# Patient Record
Sex: MALE | Race: ASIAN | URBAN - METROPOLITAN AREA
[De-identification: names, ages, dates, MRNs, and addresses within clinical notes are randomized per-mention and may not be internally consistent; named-entity substitution may affect disease eponyms.]

---

## 2018-04-26 ENCOUNTER — TELEPHONE (OUTPATIENT)
Dept: FAMILY MEDICINE CLINIC | Age: 53
End: 2018-04-26

## 2018-05-14 ENCOUNTER — OFFICE VISIT (OUTPATIENT)
Dept: FAMILY MEDICINE CLINIC | Age: 53
End: 2018-05-14

## 2018-05-14 VITALS
BODY MASS INDEX: 25.54 KG/M2 | DIASTOLIC BLOOD PRESSURE: 72 MMHG | OXYGEN SATURATION: 98 % | WEIGHT: 178.4 LBS | SYSTOLIC BLOOD PRESSURE: 110 MMHG | HEART RATE: 97 BPM | HEIGHT: 70 IN | RESPIRATION RATE: 12 BRPM

## 2018-05-14 DIAGNOSIS — E78.2 MIXED HYPERLIPIDEMIA: ICD-10-CM

## 2018-05-14 DIAGNOSIS — Z00.00 WELL ADULT EXAM: Primary | ICD-10-CM

## 2018-05-14 PROCEDURE — 99396 PREV VISIT EST AGE 40-64: CPT | Performed by: FAMILY MEDICINE

## 2018-05-14 RX ORDER — SCOLOPAMINE TRANSDERMAL SYSTEM 1 MG/1
1 PATCH, EXTENDED RELEASE TRANSDERMAL
Qty: 4 PATCH | Refills: 0 | Status: SHIPPED | OUTPATIENT
Start: 2018-05-14 | End: 2019-05-14

## 2018-05-14 ASSESSMENT — PATIENT HEALTH QUESTIONNAIRE - PHQ9
SUM OF ALL RESPONSES TO PHQ QUESTIONS 1-9: 0
2. FEELING DOWN, DEPRESSED OR HOPELESS: 0
SUM OF ALL RESPONSES TO PHQ9 QUESTIONS 1 & 2: 0
1. LITTLE INTEREST OR PLEASURE IN DOING THINGS: 0

## 2018-05-15 ASSESSMENT — ENCOUNTER SYMPTOMS
GASTROINTESTINAL NEGATIVE: 1
SHORTNESS OF BREATH: 0
COUGH: 0

## 2018-05-16 DIAGNOSIS — Z00.00 WELL ADULT EXAM: ICD-10-CM

## 2018-05-16 LAB
A/G RATIO: 1.8 (ref 1.1–2.2)
ALBUMIN SERPL-MCNC: 4.4 G/DL (ref 3.4–5)
ALP BLD-CCNC: 66 U/L (ref 40–129)
ALT SERPL-CCNC: 43 U/L (ref 10–40)
ANION GAP SERPL CALCULATED.3IONS-SCNC: 14 MMOL/L (ref 3–16)
AST SERPL-CCNC: 35 U/L (ref 15–37)
BILIRUB SERPL-MCNC: 0.6 MG/DL (ref 0–1)
BUN BLDV-MCNC: 16 MG/DL (ref 7–20)
CALCIUM SERPL-MCNC: 9 MG/DL (ref 8.3–10.6)
CHLORIDE BLD-SCNC: 99 MMOL/L (ref 99–110)
CHOLESTEROL, TOTAL: 215 MG/DL (ref 0–199)
CO2: 27 MMOL/L (ref 21–32)
CREAT SERPL-MCNC: 0.8 MG/DL (ref 0.9–1.3)
GFR AFRICAN AMERICAN: >60
GFR NON-AFRICAN AMERICAN: >60
GLOBULIN: 2.5 G/DL
GLUCOSE BLD-MCNC: 90 MG/DL (ref 70–99)
HDLC SERPL-MCNC: 45 MG/DL (ref 40–60)
LDL CHOLESTEROL CALCULATED: 143 MG/DL
POTASSIUM SERPL-SCNC: 4.4 MMOL/L (ref 3.5–5.1)
SODIUM BLD-SCNC: 140 MMOL/L (ref 136–145)
TOTAL PROTEIN: 6.9 G/DL (ref 6.4–8.2)
TRIGL SERPL-MCNC: 136 MG/DL (ref 0–150)
VLDLC SERPL CALC-MCNC: 27 MG/DL

## 2018-05-17 LAB
HIV AG/AB: NORMAL
HIV ANTIGEN: NORMAL
HIV-1 ANTIBODY: NORMAL
HIV-2 AB: NORMAL

## 2019-08-16 ENCOUNTER — OFFICE VISIT (OUTPATIENT)
Dept: FAMILY MEDICINE CLINIC | Age: 54
End: 2019-08-16
Payer: COMMERCIAL

## 2019-08-16 VITALS
BODY MASS INDEX: 23.68 KG/M2 | SYSTOLIC BLOOD PRESSURE: 122 MMHG | HEART RATE: 50 BPM | DIASTOLIC BLOOD PRESSURE: 72 MMHG | HEIGHT: 70 IN | WEIGHT: 165.4 LBS | RESPIRATION RATE: 12 BRPM | OXYGEN SATURATION: 98 %

## 2019-08-16 DIAGNOSIS — R05.9 COUGH: ICD-10-CM

## 2019-08-16 DIAGNOSIS — Z00.00 WELL ADULT EXAM: Primary | ICD-10-CM

## 2019-08-16 PROCEDURE — 99396 PREV VISIT EST AGE 40-64: CPT | Performed by: FAMILY MEDICINE

## 2019-08-16 ASSESSMENT — PATIENT HEALTH QUESTIONNAIRE - PHQ9
SUM OF ALL RESPONSES TO PHQ QUESTIONS 1-9: 0
2. FEELING DOWN, DEPRESSED OR HOPELESS: 0
SUM OF ALL RESPONSES TO PHQ9 QUESTIONS 1 & 2: 0
1. LITTLE INTEREST OR PLEASURE IN DOING THINGS: 0
SUM OF ALL RESPONSES TO PHQ QUESTIONS 1-9: 0

## 2019-08-16 ASSESSMENT — ENCOUNTER SYMPTOMS: RESPIRATORY NEGATIVE: 1

## 2019-08-16 NOTE — PROGRESS NOTES
normal mood and affect. His behavior is normal. Judgment and thought content normal.       Assessment:       Diagnosis Orders   1. Well adult exam  Lipid Panel    Comprehensive Metabolic Panel    VITAMIN B12 & FOLATE    Vitamin D 25 Hydroxy   2. Cough  XR CHEST STANDARD (2 VW)          Plan:      Courtney was seen today for annual exam.    Diagnoses and all orders for this visit:    Well adult exam  -     Lipid Panel; Future  -     Comprehensive Metabolic Panel; Future  -     VITAMIN B12 & FOLATE; Future  -     Vitamin D 25 Hydroxy; Future  Reviewed diet and exercise. Fasting bloodwork ordered. Vegetarian. b12 otc recommended  Cough  -     XR CHEST STANDARD (2 VW);  Future  chest X-ray per pt preference           Amy Diaz MD

## 2019-08-19 DIAGNOSIS — Z00.00 WELL ADULT EXAM: ICD-10-CM

## 2019-08-19 LAB
A/G RATIO: 2.2 (ref 1.1–2.2)
ALBUMIN SERPL-MCNC: 4.8 G/DL (ref 3.4–5)
ALP BLD-CCNC: 71 U/L (ref 40–129)
ALT SERPL-CCNC: 19 U/L (ref 10–40)
ANION GAP SERPL CALCULATED.3IONS-SCNC: 14 MMOL/L (ref 3–16)
AST SERPL-CCNC: 27 U/L (ref 15–37)
BILIRUB SERPL-MCNC: 0.6 MG/DL (ref 0–1)
BUN BLDV-MCNC: 17 MG/DL (ref 7–20)
CALCIUM SERPL-MCNC: 9.5 MG/DL (ref 8.3–10.6)
CHLORIDE BLD-SCNC: 102 MMOL/L (ref 99–110)
CHOLESTEROL, TOTAL: 199 MG/DL (ref 0–199)
CO2: 25 MMOL/L (ref 21–32)
CREAT SERPL-MCNC: 0.8 MG/DL (ref 0.9–1.3)
FOLATE: 16.63 NG/ML (ref 4.78–24.2)
GFR AFRICAN AMERICAN: >60
GFR NON-AFRICAN AMERICAN: >60
GLOBULIN: 2.2 G/DL
GLUCOSE BLD-MCNC: 80 MG/DL (ref 70–99)
HDLC SERPL-MCNC: 53 MG/DL (ref 40–60)
LDL CHOLESTEROL CALCULATED: 126 MG/DL
POTASSIUM SERPL-SCNC: 4.1 MMOL/L (ref 3.5–5.1)
SODIUM BLD-SCNC: 141 MMOL/L (ref 136–145)
TOTAL PROTEIN: 7 G/DL (ref 6.4–8.2)
TRIGL SERPL-MCNC: 100 MG/DL (ref 0–150)
VITAMIN B-12: 153 PG/ML (ref 211–911)
VITAMIN D 25-HYDROXY: 12.3 NG/ML
VLDLC SERPL CALC-MCNC: 20 MG/DL

## 2020-04-23 ENCOUNTER — NURSE TRIAGE (OUTPATIENT)
Dept: OTHER | Facility: CLINIC | Age: 55
End: 2020-04-23

## 2020-04-23 NOTE — TELEPHONE ENCOUNTER
Reason for Disposition   Pain in the big toe joint    Answer Assessment - Initial Assessment Questions  1. ONSET: \"When did the pain start? \"       A month and a half ago  2. LOCATION: \"Where is the pain located? \"   (e.g., around nail, entire toe, at foot joint)       Left big toe and toe next to it  3. PAIN: \"How bad is the pain? \"    (Scale 1-10; or mild, moderate, severe)    -  MILD (1-3): doesn't interfere with normal activities     -  MODERATE (4-7): interferes with normal activities (e.g., work or school) or awakens from sleep, limping     -  SEVERE (8-10): excruciating pain, unable to do any normal activities, unable to walk     3-4  4. APPEARANCE: \"What does the toe look like? \" (e.g., redness, swelling, bruising, pallor)     States the skin is hard and pale   5. CAUSE: \"What do you think is causing the toe pain? \"     Feels like there is no circulation   6. OTHER SYMPTOMS: \"Do you have any other symptoms? \" (e.g., leg pain, rash, fever, numbness)     no  7. PREGNANCY: \"Is there any chance you are pregnant? \" \"When was your last menstrual period? \"      n/a    Protocols used: TOE PAIN-ADULT-[place]    Received call from hotline. Recommended PCP or virtual visit within 3 days per protocol. Call transferred to [facility] for scheduling. Patient having no complaints of CP or SOB. Informed when to call nurse back.

## 2020-04-24 ENCOUNTER — VIRTUAL VISIT (OUTPATIENT)
Dept: FAMILY MEDICINE CLINIC | Age: 55
End: 2020-04-24
Payer: COMMERCIAL

## 2020-04-24 PROCEDURE — 3017F COLORECTAL CA SCREEN DOC REV: CPT | Performed by: FAMILY MEDICINE

## 2020-04-24 PROCEDURE — 1036F TOBACCO NON-USER: CPT | Performed by: FAMILY MEDICINE

## 2020-04-24 PROCEDURE — 99212 OFFICE O/P EST SF 10 MIN: CPT | Performed by: FAMILY MEDICINE

## 2020-04-24 PROCEDURE — G8420 CALC BMI NORM PARAMETERS: HCPCS | Performed by: FAMILY MEDICINE

## 2020-04-24 PROCEDURE — G8427 DOCREV CUR MEDS BY ELIG CLIN: HCPCS | Performed by: FAMILY MEDICINE

## 2020-04-24 ASSESSMENT — PATIENT HEALTH QUESTIONNAIRE - PHQ9
2. FEELING DOWN, DEPRESSED OR HOPELESS: 0
1. LITTLE INTEREST OR PLEASURE IN DOING THINGS: 0
SUM OF ALL RESPONSES TO PHQ QUESTIONS 1-9: 0
SUM OF ALL RESPONSES TO PHQ9 QUESTIONS 1 & 2: 0
SUM OF ALL RESPONSES TO PHQ QUESTIONS 1-9: 0

## 2020-04-24 NOTE — PROGRESS NOTES
2020    TELEHEALTH EVALUATION -- Audio/Visual (During SHFTK-85 public health emergency)    HPI:    Markus Birmingham (:  1965) has requested an audio/video evaluation for the following concern(s):    Chief Complaint   Patient presents with    Toe Pain     Patient complains of right big toe pain (discolored) that is also in other toes as well for the past 6 weeks. Denies injury. Right big toe pain   Discoloration of 1st 2 toes  Hurts at the tips of 1st 3 toes. Feet don't feel cold. Some relief with soaks. No injury. Started 1 1/2 months ago. Symptoms not worse with exertion, only to palpation    Review of Systems  No shortness of breath or exercise intolerance  Prior to Visit Medications    Not on File       Social History     Tobacco Use    Smoking status: Former Smoker     Packs/day: 0.25     Years: 10.00     Pack years: 2.50    Smokeless tobacco: Never Used   Substance Use Topics    Alcohol use: Yes     Comment: Occ    Drug use: No        Allergies   Allergen Reactions    Seasonal        PHYSICAL EXAMINATION:  [ INSTRUCTIONS:  \"[x]\" Indicates a positive item  \"[]\" Indicates a negative item  -- DELETE ALL ITEMS NOT EXAMINED]  Vital Signs: (As obtained by patient/caregiver or practitioner observation)    Blood pressure-  Heart rate-    Respiratory rate-    Temperature-  Pulse oximetry-     Constitutional: [] Appears well-developed and well-nourished [] No apparent distress      [] Abnormal-   Mental status  [] Alert and awake  [] Oriented to person/place/time []Able to follow commands      Eyes:  EOM    []  Normal  [] Abnormal-  Sclera  []  Normal  [] Abnormal -         Discharge []  None visible  [] Abnormal -    HENT:   [] Normocephalic, atraumatic.   [] Abnormal   [] Mouth/Throat: Mucous membranes are moist.     External Ears [] Normal  [] Abnormal-     Neck: [] No visualized mass     Pulmonary/Chest: [] Respiratory effort normal.  [] No visualized signs of difficulty breathing or

## 2021-03-18 ENCOUNTER — OFFICE VISIT (OUTPATIENT)
Dept: FAMILY MEDICINE CLINIC | Age: 56
End: 2021-03-18
Payer: COMMERCIAL

## 2021-03-18 VITALS
HEIGHT: 70 IN | TEMPERATURE: 97.4 F | WEIGHT: 174.4 LBS | HEART RATE: 49 BPM | SYSTOLIC BLOOD PRESSURE: 122 MMHG | OXYGEN SATURATION: 99 % | BODY MASS INDEX: 24.97 KG/M2 | DIASTOLIC BLOOD PRESSURE: 72 MMHG

## 2021-03-18 DIAGNOSIS — I73.00 RAYNAUD'S DISEASE WITHOUT GANGRENE: Primary | ICD-10-CM

## 2021-03-18 DIAGNOSIS — I73.00 RAYNAUD'S DISEASE WITHOUT GANGRENE: ICD-10-CM

## 2021-03-18 LAB
A/G RATIO: 1.6 (ref 1.1–2.2)
ALBUMIN SERPL-MCNC: 4.1 G/DL (ref 3.4–5)
ALP BLD-CCNC: 73 U/L (ref 40–129)
ALT SERPL-CCNC: 16 U/L (ref 10–40)
ANION GAP SERPL CALCULATED.3IONS-SCNC: 9 MMOL/L (ref 3–16)
ANTI-NUCLEAR ANTIBODY (ANA): NEGATIVE
AST SERPL-CCNC: 21 U/L (ref 15–37)
BASOPHILS ABSOLUTE: 0 K/UL (ref 0–0.2)
BASOPHILS RELATIVE PERCENT: 0.6 %
BILIRUB SERPL-MCNC: 0.5 MG/DL (ref 0–1)
BUN BLDV-MCNC: 17 MG/DL (ref 7–20)
C-REACTIVE PROTEIN: <3 MG/L (ref 0–5.1)
CALCIUM SERPL-MCNC: 9.1 MG/DL (ref 8.3–10.6)
CHLORIDE BLD-SCNC: 106 MMOL/L (ref 99–110)
CO2: 26 MMOL/L (ref 21–32)
CREAT SERPL-MCNC: 0.8 MG/DL (ref 0.9–1.3)
CYCLIC CITRULLINATED PEPTIDE ANTIBODY IGG: 0.5 U/ML (ref 0–2.9)
EOSINOPHILS ABSOLUTE: 0.2 K/UL (ref 0–0.6)
EOSINOPHILS RELATIVE PERCENT: 5 %
GFR AFRICAN AMERICAN: >60
GFR NON-AFRICAN AMERICAN: >60
GLOBULIN: 2.5 G/DL
GLUCOSE BLD-MCNC: 90 MG/DL (ref 70–99)
HCT VFR BLD CALC: 38.5 % (ref 40.5–52.5)
HEMOGLOBIN: 13.1 G/DL (ref 13.5–17.5)
LYMPHOCYTES ABSOLUTE: 1.2 K/UL (ref 1–5.1)
LYMPHOCYTES RELATIVE PERCENT: 26.6 %
MCH RBC QN AUTO: 30.3 PG (ref 26–34)
MCHC RBC AUTO-ENTMCNC: 34.1 G/DL (ref 31–36)
MCV RBC AUTO: 88.8 FL (ref 80–100)
MONOCYTES ABSOLUTE: 0.6 K/UL (ref 0–1.3)
MONOCYTES RELATIVE PERCENT: 13.4 %
NEUTROPHILS ABSOLUTE: 2.4 K/UL (ref 1.7–7.7)
NEUTROPHILS RELATIVE PERCENT: 54.4 %
PDW BLD-RTO: 13.1 % (ref 12.4–15.4)
PLATELET # BLD: 140 K/UL (ref 135–450)
PMV BLD AUTO: 8.9 FL (ref 5–10.5)
POTASSIUM SERPL-SCNC: 4.3 MMOL/L (ref 3.5–5.1)
RBC # BLD: 4.34 M/UL (ref 4.2–5.9)
RHEUMATOID FACTOR: <10 IU/ML
SEDIMENTATION RATE, ERYTHROCYTE: 7 MM/HR (ref 0–20)
SODIUM BLD-SCNC: 141 MMOL/L (ref 136–145)
TOTAL PROTEIN: 6.6 G/DL (ref 6.4–8.2)
TSH REFLEX: 1.74 UIU/ML (ref 0.27–4.2)
URIC ACID, SERUM: 4.9 MG/DL (ref 3.5–7.2)
WBC # BLD: 4.4 K/UL (ref 4–11)

## 2021-03-18 PROCEDURE — 99213 OFFICE O/P EST LOW 20 MIN: CPT | Performed by: FAMILY MEDICINE

## 2021-03-18 PROCEDURE — G8427 DOCREV CUR MEDS BY ELIG CLIN: HCPCS | Performed by: FAMILY MEDICINE

## 2021-03-18 PROCEDURE — 1036F TOBACCO NON-USER: CPT | Performed by: FAMILY MEDICINE

## 2021-03-18 PROCEDURE — 3017F COLORECTAL CA SCREEN DOC REV: CPT | Performed by: FAMILY MEDICINE

## 2021-03-18 PROCEDURE — G8419 CALC BMI OUT NRM PARAM NOF/U: HCPCS | Performed by: FAMILY MEDICINE

## 2021-03-18 PROCEDURE — G8484 FLU IMMUNIZE NO ADMIN: HCPCS | Performed by: FAMILY MEDICINE

## 2021-03-18 RX ORDER — AMLODIPINE BESYLATE 2.5 MG/1
2.5 TABLET ORAL DAILY
Qty: 30 TABLET | Refills: 5 | Status: SHIPPED | OUTPATIENT
Start: 2021-03-18 | End: 2022-03-31

## 2021-03-18 SDOH — ECONOMIC STABILITY: FOOD INSECURITY: WITHIN THE PAST 12 MONTHS, THE FOOD YOU BOUGHT JUST DIDN'T LAST AND YOU DIDN'T HAVE MONEY TO GET MORE.: NEVER TRUE

## 2021-03-18 SDOH — ECONOMIC STABILITY: TRANSPORTATION INSECURITY
IN THE PAST 12 MONTHS, HAS LACK OF TRANSPORTATION KEPT YOU FROM MEETINGS, WORK, OR FROM GETTING THINGS NEEDED FOR DAILY LIVING?: NO

## 2021-03-18 ASSESSMENT — PATIENT HEALTH QUESTIONNAIRE - PHQ9
1. LITTLE INTEREST OR PLEASURE IN DOING THINGS: 0
SUM OF ALL RESPONSES TO PHQ QUESTIONS 1-9: 0

## 2021-03-18 NOTE — PROGRESS NOTES
Alberto Schmitz (:  1965) is a 50 y.o. male,Established patient, here for evaluation of the following chief complaint(s): Foot Pain (Patient complains of bilateral foot pain x2 weeks. )      ASSESSMENT/PLAN:   Diagnosis Orders   1. Raynaud's disease without gangrene  Sedimentation Rate    C-Reactive Protein    Cyclic Citrul Peptide Antibody, IgG    Uric Acid    Rheumatoid Factor    TSH with Reflex    CBC Auto Differential    Comprehensive Metabolic Panel    amLODIPine (NORVASC) 2.5 MG tablet    ELBA profile    Cold agglutinin screen     Trial of med since so symptomatic. Evaluation since onset abrupt. Consider referral to rheumatology  No follow-ups on file. SUBJECTIVE/OBJECTIVE:  HPI   Pt is a of 50 y.o. male comes in today with   Chief Complaint   Patient presents with    Foot Pain     Patient complains of bilateral foot pain x2 weeks. Bilateral toe pain for 2 weeks. Has kept him from running. Hurts to touch. Red, swollen. advil has taken the edge off. Soaks help temporarily. Episode 2-3 months ago but wasn't severe and didn't last as long    Allergies   Allergen Reactions    Seasonal      Vitals:    21 0853   BP: 122/72   Site: Left Upper Arm   Position: Sitting   Cuff Size: Large Adult   Pulse: (!) 49   Temp: 97.4 °F (36.3 °C)   TempSrc: Infrared   SpO2: 99%   Weight: 174 lb 6.4 oz (79.1 kg)   Height: 5' 10\" (1.778 m)      Review of Systems    Physical Exam  Normal dp and pt pulses  Toes cold, white            An electronic signature was used to authenticate this note.     --Cade Busch MD

## 2021-03-21 LAB — COLD AGGLUTININ TITER: NORMAL

## 2022-03-31 ENCOUNTER — OFFICE VISIT (OUTPATIENT)
Dept: FAMILY MEDICINE CLINIC | Age: 57
End: 2022-03-31
Payer: COMMERCIAL

## 2022-03-31 VITALS
DIASTOLIC BLOOD PRESSURE: 68 MMHG | HEIGHT: 70 IN | HEART RATE: 50 BPM | OXYGEN SATURATION: 98 % | BODY MASS INDEX: 25.48 KG/M2 | SYSTOLIC BLOOD PRESSURE: 110 MMHG | WEIGHT: 178 LBS

## 2022-03-31 DIAGNOSIS — M65.4 RADIAL STYLOID TENOSYNOVITIS OF LEFT HAND: Primary | ICD-10-CM

## 2022-03-31 PROCEDURE — G8484 FLU IMMUNIZE NO ADMIN: HCPCS | Performed by: FAMILY MEDICINE

## 2022-03-31 PROCEDURE — 1036F TOBACCO NON-USER: CPT | Performed by: FAMILY MEDICINE

## 2022-03-31 PROCEDURE — G8427 DOCREV CUR MEDS BY ELIG CLIN: HCPCS | Performed by: FAMILY MEDICINE

## 2022-03-31 PROCEDURE — 3017F COLORECTAL CA SCREEN DOC REV: CPT | Performed by: FAMILY MEDICINE

## 2022-03-31 PROCEDURE — 99213 OFFICE O/P EST LOW 20 MIN: CPT | Performed by: FAMILY MEDICINE

## 2022-03-31 PROCEDURE — G8419 CALC BMI OUT NRM PARAM NOF/U: HCPCS | Performed by: FAMILY MEDICINE

## 2022-03-31 RX ORDER — DICLOFENAC SODIUM 75 MG/1
75 TABLET, DELAYED RELEASE ORAL 2 TIMES DAILY
Qty: 60 TABLET | Refills: 3 | Status: SHIPPED | OUTPATIENT
Start: 2022-03-31

## 2022-03-31 SDOH — ECONOMIC STABILITY: FOOD INSECURITY: WITHIN THE PAST 12 MONTHS, YOU WORRIED THAT YOUR FOOD WOULD RUN OUT BEFORE YOU GOT MONEY TO BUY MORE.: NEVER TRUE

## 2022-03-31 SDOH — ECONOMIC STABILITY: FOOD INSECURITY: WITHIN THE PAST 12 MONTHS, THE FOOD YOU BOUGHT JUST DIDN'T LAST AND YOU DIDN'T HAVE MONEY TO GET MORE.: NEVER TRUE

## 2022-03-31 ASSESSMENT — PATIENT HEALTH QUESTIONNAIRE - PHQ9
SUM OF ALL RESPONSES TO PHQ QUESTIONS 1-9: 0
1. LITTLE INTEREST OR PLEASURE IN DOING THINGS: 0
SUM OF ALL RESPONSES TO PHQ9 QUESTIONS 1 & 2: 0
SUM OF ALL RESPONSES TO PHQ QUESTIONS 1-9: 0
SUM OF ALL RESPONSES TO PHQ QUESTIONS 1-9: 0
2. FEELING DOWN, DEPRESSED OR HOPELESS: 0
SUM OF ALL RESPONSES TO PHQ QUESTIONS 1-9: 0

## 2022-03-31 ASSESSMENT — SOCIAL DETERMINANTS OF HEALTH (SDOH): HOW HARD IS IT FOR YOU TO PAY FOR THE VERY BASICS LIKE FOOD, HOUSING, MEDICAL CARE, AND HEATING?: NOT HARD AT ALL

## 2022-03-31 NOTE — PATIENT INSTRUCTIONS
Patient Education        Selina Dodge Tenosynovitis: Care Instructions  Your Care Instructions  Selina Dodge (say \"Janelle\") tenosynovitis is a problem that makes the bottom of your thumb and the side of your wrist hurt. When you have de Quervain's, the ropey fiber (tendon) that helps move your thumb away from yourfingers becomes swollen. You may have pain when you move your wrist or pick things up. You may hear acreaking sound when you move your wrist or thumb. Symptoms often get better in a few weeks with home care. Your doctor may want you to start some gentle stretching exercises once your symptoms are gone. Sometimes treatment with an injection or surgery is needed. Follow-up care is a key part of your treatment and safety. Be sure to make and go to all appointments, and call your doctor if you are having problems. It's also a good idea to know your test results and keep alist of the medicines you take. How can you care for yourself at home?  Until your symptoms are better, stop the activities that caused the pain.  Avoid moving the hand and wrist that hurt.  Follow your doctor's directions for wearing a splint to keep your thumb and wrist from moving.  Try ice or heat. ? Put ice or a cold pack on your thumb and wrist for 10 to 20 minutes at a time. Put a thin cloth between the ice and your skin. ? You can use heat for 20 to 30 minutes, 2 or 3 times a day. Try using a heating pad, hot shower, or hot pack.  Ask your doctor if you can take an over-the-counter pain medicine, such as acetaminophen (Tylenol), ibuprofen (Advil, Motrin), or naproxen (Aleve). Be safe with medicines. Read and follow all instructions on the label. When should you call for help?   Watch closely for changes in your health, and be sure to contact your doctor if:     You have new or worse pain.      You have new or worse numbness or tingling in your hand or fingers.      Your hand feels weaker.      You do not get better as expected. Where can you learn more? Go to https://chpepiceweb.[email or web address] org and sign in to your [employer] account. Enter G107 in the [facility] box to learn more about \"De Quervain's Tenosynovitis: Care Instructions. \"     If you do not have an account, please click on the \"Sign Up Now\" link. Current as of: July 1, 2021               Content Version: 13.2  © 2006-2022 Healthwise, [employer]. Care instructions adapted under license by [facility] ([facility]). If you have questions about a medical condition or this instruction, always ask your healthcare professional. [address] any warranty or liability for your use of this information. Patient Education        Graeme Gabirais Disease: Exercises  Introduction  Here are some examples of exercises for you to try. The exercises may be suggested for a condition or for rehabilitation. Start each exercise slowly. Ease off the exercises if you start to have pain. You will be told when to start these exercises and which ones will work bestfor you. How to do the exercises  Thumb lifts    1. Place your hand on a flat surface, with your palm up. 2. Lift your thumb away from your palm to make a \"C\" shape. 3. Hold for about 6 seconds. 4. Repeat 8 to 12 times. Passive thumb MP flexion    1. Hold your hand in front of you, and turn your hand so your little finger faces down and your thumb faces up. (Your hand should be in the position used for shaking someone's hand.) You may also rest your hand on a flat surface. 2. Use the fingers on your other hand to bend your thumb down at the point where your thumb connects to your palm. 3. Hold for at least 15 to 30 seconds. 4. Repeat 2 to 4 times. Finkelstein stretch    1. Hold your arms out in front of you. (Your hand should be in the position used for shaking someone's hand.)  2. Bend your thumb toward your palm.   3. Use your other hand to gently stretch your thumb and wrist downward until you feel the stretch on the thumb side of your wrist.  4. Hold for at least 15 to 30 seconds. 5. Repeat 2 to 4 times. Resisted ulnar deviation    For this exercise, you will need elastic exercise material, such as surgicaltubing or Thera-Band. 1. Sit leaning forward with your legs slightly spread and your elbow on your thigh. 2. Grasp one end of the band with your palm down, and step on the other end with the foot opposite the hand holding the band. 3. Slowly bend your wrist sideways and away from your knee. 4. Repeat 8 to 12 times. Follow-up care is a key part of your treatment and safety. Be sure to make and go to all appointments, and call your doctor if you are having problems. It's also a good idea to know your test results and keep alist of the medicines you take. Where can you learn more? Go to https://Flutura SolutionspelilyVolexeb.[employer] org and sign in to your [employer] account. Enter G164 in the [employer] box to learn more about \"De Quervain's Disease: Exercises. \"     If you do not have an account, please click on the \"Sign Up Now\" link. Current as of: July 1, 2021               Content Version: 13.2  © 9673-1222 Healthwise, Incorporated. Care instructions adapted under license by [facility] ([facility]). If you have questions about a medical condition or this instruction, always ask your healthcare professional. [place] any warranty or liability for your use of this information.

## 2022-03-31 NOTE — PROGRESS NOTES
Deonna Fraire (:  1965) is a 49 y.o. male,Established patient, here for evaluation of the following chief complaint(s):  Pain (L thumb pain when extending.)         ASSESSMENT/PLAN:  Courtney was seen today for pain. Diagnoses and all orders for this visit:    Radial styloid tenosynovitis of left hand    Other orders  -     diclofenac (VOLTAREN) 75 MG EC tablet; Take 1 tablet by mouth 2 times daily    reviewed bracing  nsaid  Hep  Advised to call back directly if there are further questions, or if these symptoms fail to improve as anticipated or worsen. No follow-ups on file. Subjective   SUBJECTIVE/OBJECTIVE:  HPI   Pt is a of 49 y.o. male comes in today with   Chief Complaint   Patient presents with    Pain     L thumb pain when extending. top of left thumb started to hurt a few months ago. No injury  Vitals:    22 0913   BP: 110/68   Site: Left Upper Arm   Position: Sitting   Cuff Size: Small Adult   Pulse: 50   SpO2: 98%   Weight: 178 lb (80.7 kg)   Height: 5' 10\" (1.778 m)       Past Medical History:Reviewed  Medications:Reviewed. Allergies   Allergen Reactions    Seasonal       Social hx:Reviewed. Social History     Tobacco Use   Smoking Status Former Smoker    Packs/day: 0.25    Years: 10.00    Pack years: 2.50   Smokeless Tobacco Never Used       Review of Systems       Objective   Physical Exam         An electronic signature was used to authenticate this note.     --Tracey Stephens MD

## 2022-04-01 ENCOUNTER — TELEPHONE (OUTPATIENT)
Dept: FAMILY MEDICINE CLINIC | Age: 57
End: 2022-04-01

## 2022-04-01 DIAGNOSIS — Z00.00 WELL ADULT EXAM: ICD-10-CM

## 2022-04-01 DIAGNOSIS — E78.2 MIXED HYPERLIPIDEMIA: ICD-10-CM

## 2022-04-01 DIAGNOSIS — Z00.00 WELL ADULT EXAM: Primary | ICD-10-CM

## 2022-04-01 LAB
A/G RATIO: 1.8 (ref 1.1–2.2)
ALBUMIN SERPL-MCNC: 4.4 G/DL (ref 3.4–5)
ALP BLD-CCNC: 62 U/L (ref 40–129)
ALT SERPL-CCNC: 29 U/L (ref 10–40)
ANION GAP SERPL CALCULATED.3IONS-SCNC: 11 MMOL/L (ref 3–16)
AST SERPL-CCNC: 30 U/L (ref 15–37)
BILIRUB SERPL-MCNC: 0.5 MG/DL (ref 0–1)
BUN BLDV-MCNC: 15 MG/DL (ref 7–20)
CALCIUM SERPL-MCNC: 9.1 MG/DL (ref 8.3–10.6)
CHLORIDE BLD-SCNC: 103 MMOL/L (ref 99–110)
CHOLESTEROL, TOTAL: 233 MG/DL (ref 0–199)
CO2: 25 MMOL/L (ref 21–32)
CREAT SERPL-MCNC: 0.9 MG/DL (ref 0.9–1.3)
GFR AFRICAN AMERICAN: >60
GFR NON-AFRICAN AMERICAN: >60
GLUCOSE BLD-MCNC: 88 MG/DL (ref 70–99)
HDLC SERPL-MCNC: 50 MG/DL (ref 40–60)
LDL CHOLESTEROL CALCULATED: 158 MG/DL
POTASSIUM SERPL-SCNC: 4.5 MMOL/L (ref 3.5–5.1)
SODIUM BLD-SCNC: 139 MMOL/L (ref 136–145)
TOTAL PROTEIN: 6.8 G/DL (ref 6.4–8.2)
TRIGL SERPL-MCNC: 126 MG/DL (ref 0–150)
VLDLC SERPL CALC-MCNC: 25 MG/DL

## 2022-04-01 NOTE — TELEPHONE ENCOUNTER
Pt called and is currently at the lab. Pt was under the impression that his labs were already in his chart but they are not. Pt upset stating that he left his job to get his work and would like them called in. Tried to contact MA's but none available. Please put in orders ASAP and call pt.

## 2022-04-07 ENCOUNTER — OFFICE VISIT (OUTPATIENT)
Dept: FAMILY MEDICINE CLINIC | Age: 57
End: 2022-04-07
Payer: COMMERCIAL

## 2022-04-07 VITALS
DIASTOLIC BLOOD PRESSURE: 74 MMHG | WEIGHT: 176 LBS | HEART RATE: 90 BPM | OXYGEN SATURATION: 98 % | SYSTOLIC BLOOD PRESSURE: 120 MMHG | BODY MASS INDEX: 25.2 KG/M2 | HEIGHT: 70 IN

## 2022-04-07 DIAGNOSIS — E78.2 MIXED HYPERLIPIDEMIA: ICD-10-CM

## 2022-04-07 DIAGNOSIS — Z00.00 WELL ADULT EXAM: Primary | ICD-10-CM

## 2022-04-07 PROCEDURE — 99396 PREV VISIT EST AGE 40-64: CPT | Performed by: FAMILY MEDICINE

## 2022-04-07 NOTE — PROGRESS NOTES
Leon Livingston (:  1965) is a 49 y.o. male,Established patient, here for evaluation of the following chief complaint(s): Annual Exam         ASSESSMENT/PLAN:  Jennifer Lyles was seen today for annual exam.    Diagnoses and all orders for this visit:    Well adult exam  Continue diet and exercise    Mixed hyperlipidemia  -     Lipid Panel; Future  Recheck lipid panel in a few months           Subjective   SUBJECTIVE/OBJECTIVE:  HPI   Pt is a of 49 y.o. male comes in today with   Chief Complaint   Patient presents with    Annual Exam     Wrist not doing any better. Vegetarian and not a lot of dairy. Was in [facility] prior to blood work  Exercise has been good    Vitals:    22 0949   BP: 120/74   Site: Left Upper Arm   Position: Sitting   Cuff Size: Small Adult   Pulse: 90   SpO2: 98%   Weight: 176 lb (79.8 kg)   Height: 5' 10\" (1.778 m)        Past Medical History:Reviewed  Medications:Reviewed. Allergies   Allergen Reactions    Seasonal       Social hx:Reviewed. Social History     Tobacco Use   Smoking Status Former Smoker    Packs/day: 0.25    Years: 10.00    Pack years: 2.50   Smokeless Tobacco Never Used         Review of Systems   Constitutional: Negative. Respiratory: Negative. Cardiovascular: Negative. Objective   Physical Exam  Constitutional:       Appearance: He is well-developed. HENT:      Head: Normocephalic and atraumatic. Right Ear: Tympanic membrane, ear canal and external ear normal.      Left Ear: Tympanic membrane, ear canal and external ear normal.   Eyes:      General: No scleral icterus. Conjunctiva/sclera: Conjunctivae normal.   Neck:      Thyroid: No thyromegaly. Cardiovascular:      Rate and Rhythm: Normal rate and regular rhythm. Heart sounds: Normal heart sounds. No murmur heard. Pulmonary:      Effort: Pulmonary effort is normal.      Breath sounds: Normal breath sounds. No wheezing or rales.    Abdominal:      General: Bowel sounds are normal. There is no distension. Palpations: Abdomen is soft. There is no hepatomegaly or splenomegaly. Tenderness: There is no abdominal tenderness. Musculoskeletal:      Cervical back: Normal range of motion and neck supple. Skin:     General: Skin is warm and dry. Neurological:      Mental Status: He is alert and oriented to person, place, and time. Cranial Nerves: No cranial nerve deficit. Deep Tendon Reflexes: Reflexes are normal and symmetric. Psychiatric:         Behavior: Behavior normal.         Thought Content: Thought content normal.         Judgment: Judgment normal.              An electronic signature was used to authenticate this note.     --Marvin Goins MD

## 2022-04-10 ASSESSMENT — ENCOUNTER SYMPTOMS: RESPIRATORY NEGATIVE: 1

## 2023-08-02 ENCOUNTER — TELEPHONE (OUTPATIENT)
Dept: FAMILY MEDICINE CLINIC | Age: 58
End: 2023-08-02

## 2023-08-02 NOTE — TELEPHONE ENCOUNTER
Niya Urrutia from [address] [place] [address] is calling in to let [facility] know that the patient is scheduled for surgery with them 09/29. He is going to be [place] until the day before his surgery. He let them know that he is either going to get the pre-op physical done either before he comes back to the [address] [place] [address] or with us the day before his surgery. If it is here an EKG will be needed. The appointment will need to be done before 3 pm since his surgery will be the day after. Please send over all notes and EKG to Fax:[phone].

## 2023-09-28 ENCOUNTER — OFFICE VISIT (OUTPATIENT)
Dept: FAMILY MEDICINE CLINIC | Age: 58
End: 2023-09-28
Payer: COMMERCIAL

## 2023-09-28 ENCOUNTER — TELEPHONE (OUTPATIENT)
Dept: FAMILY MEDICINE CLINIC | Age: 58
End: 2023-09-28

## 2023-09-28 VITALS
TEMPERATURE: 97.5 F | SYSTOLIC BLOOD PRESSURE: 128 MMHG | RESPIRATION RATE: 12 BRPM | DIASTOLIC BLOOD PRESSURE: 80 MMHG | HEART RATE: 62 BPM | HEIGHT: 70 IN | WEIGHT: 176 LBS | OXYGEN SATURATION: 99 % | BODY MASS INDEX: 25.2 KG/M2

## 2023-09-28 DIAGNOSIS — Z01.818 PREOP EXAMINATION: Primary | ICD-10-CM

## 2023-09-28 DIAGNOSIS — S83.242D ACUTE MEDIAL MENISCUS TEAR OF LEFT KNEE, SUBSEQUENT ENCOUNTER: ICD-10-CM

## 2023-09-28 PROCEDURE — G8419 CALC BMI OUT NRM PARAM NOF/U: HCPCS | Performed by: FAMILY MEDICINE

## 2023-09-28 PROCEDURE — 3017F COLORECTAL CA SCREEN DOC REV: CPT | Performed by: FAMILY MEDICINE

## 2023-09-28 PROCEDURE — G8427 DOCREV CUR MEDS BY ELIG CLIN: HCPCS | Performed by: FAMILY MEDICINE

## 2023-09-28 PROCEDURE — 93000 ELECTROCARDIOGRAM COMPLETE: CPT | Performed by: FAMILY MEDICINE

## 2023-09-28 PROCEDURE — 99213 OFFICE O/P EST LOW 20 MIN: CPT | Performed by: FAMILY MEDICINE

## 2023-09-28 PROCEDURE — 1036F TOBACCO NON-USER: CPT | Performed by: FAMILY MEDICINE

## 2023-09-28 SDOH — ECONOMIC STABILITY: HOUSING INSECURITY
IN THE LAST 12 MONTHS, WAS THERE A TIME WHEN YOU DID NOT HAVE A STEADY PLACE TO SLEEP OR SLEPT IN A SHELTER (INCLUDING NOW)?: NO

## 2023-09-28 SDOH — ECONOMIC STABILITY: FOOD INSECURITY: WITHIN THE PAST 12 MONTHS, THE FOOD YOU BOUGHT JUST DIDN'T LAST AND YOU DIDN'T HAVE MONEY TO GET MORE.: NEVER TRUE

## 2023-09-28 SDOH — ECONOMIC STABILITY: INCOME INSECURITY: HOW HARD IS IT FOR YOU TO PAY FOR THE VERY BASICS LIKE FOOD, HOUSING, MEDICAL CARE, AND HEATING?: NOT HARD AT ALL

## 2023-09-28 SDOH — ECONOMIC STABILITY: FOOD INSECURITY: WITHIN THE PAST 12 MONTHS, YOU WORRIED THAT YOUR FOOD WOULD RUN OUT BEFORE YOU GOT MONEY TO BUY MORE.: NEVER TRUE

## 2023-09-28 ASSESSMENT — PATIENT HEALTH QUESTIONNAIRE - PHQ9
SUM OF ALL RESPONSES TO PHQ QUESTIONS 1-9: 0
SUM OF ALL RESPONSES TO PHQ9 QUESTIONS 1 & 2: 0
SUM OF ALL RESPONSES TO PHQ QUESTIONS 1-9: 0
SUM OF ALL RESPONSES TO PHQ QUESTIONS 1-9: 0
2. FEELING DOWN, DEPRESSED OR HOPELESS: 0
1. LITTLE INTEREST OR PLEASURE IN DOING THINGS: 0
SUM OF ALL RESPONSES TO PHQ QUESTIONS 1-9: 0

## 2023-09-28 ASSESSMENT — ENCOUNTER SYMPTOMS: RESPIRATORY NEGATIVE: 1

## 2023-09-28 NOTE — TELEPHONE ENCOUNTER
PT just called, requests that the office fax the forms back so he can have his surgery tomorrow. Forms did not state Approved for surgery. He is asking that this be corrected and they be faxed back before 2pm or he cannot have his surgery.  The Pt is asking for a call back upon completion of this request

## 2023-09-28 NOTE — TELEPHONE ENCOUNTER
Pt seen today for his Pre-Op. [facility] called stating the forms were not filled out and the EKG was not signed. She will re-fax forms to the back line. Please complete forms and fax back ASAP. Pt's surgery is scheduled tomorrow.

## 2023-09-28 NOTE — PROGRESS NOTES
No submental or submandibular adenopathy. Left side of head: No submental or submandibular adenopathy. Cervical: No cervical adenopathy. Skin:     General: Skin is warm and dry. Neurological:      Mental Status: He is alert and oriented to person, place, and time. Cranial Nerves: No cranial nerve deficit. Psychiatric:         Behavior: Behavior normal.         Thought Content: Thought content normal.         Judgment: Judgment normal.              An electronic signature was used to authenticate this note.     --Jose Zee MD

## 2023-10-02 ENCOUNTER — PATIENT MESSAGE (OUTPATIENT)
Dept: FAMILY MEDICINE CLINIC | Age: 58
End: 2023-10-02

## 2023-10-03 NOTE — TELEPHONE ENCOUNTER
From: Bishop Goodwin  To: Dr. Vijay Johns: 10/2/2023 7:53 PM EDT  Subject: Got a new COVID vaccine    I got the latest covid vaccine today pleases update the records

## 2024-09-29 ENCOUNTER — PATIENT MESSAGE (OUTPATIENT)
Dept: FAMILY MEDICINE CLINIC | Age: 59
End: 2024-09-29

## 2025-06-11 ENCOUNTER — HOSPITAL ENCOUNTER (OUTPATIENT)
Age: 60
Discharge: HOME | End: 2025-06-11
Payer: COMMERCIAL

## 2025-06-11 ENCOUNTER — OFFICE VISIT (OUTPATIENT)
Dept: FAMILY MEDICINE CLINIC | Age: 60
End: 2025-06-11
Payer: COMMERCIAL

## 2025-06-11 ENCOUNTER — RESULTS FOLLOW-UP (OUTPATIENT)
Dept: FAMILY MEDICINE CLINIC | Age: 60
End: 2025-06-11

## 2025-06-11 VITALS
BODY MASS INDEX: 25.05 KG/M2 | OXYGEN SATURATION: 97 % | WEIGHT: 175 LBS | HEART RATE: 64 BPM | HEIGHT: 70 IN | DIASTOLIC BLOOD PRESSURE: 70 MMHG | SYSTOLIC BLOOD PRESSURE: 124 MMHG

## 2025-06-11 DIAGNOSIS — M79.671 PAIN OF RIGHT HEEL: ICD-10-CM

## 2025-06-11 DIAGNOSIS — M79.671 PAIN OF RIGHT HEEL: Primary | ICD-10-CM

## 2025-06-11 PROCEDURE — 73650 X-RAY EXAM OF HEEL: CPT

## 2025-06-11 PROCEDURE — G8419 CALC BMI OUT NRM PARAM NOF/U: HCPCS | Performed by: FAMILY MEDICINE

## 2025-06-11 PROCEDURE — 3017F COLORECTAL CA SCREEN DOC REV: CPT | Performed by: FAMILY MEDICINE

## 2025-06-11 PROCEDURE — 99213 OFFICE O/P EST LOW 20 MIN: CPT | Performed by: FAMILY MEDICINE

## 2025-06-11 PROCEDURE — 1036F TOBACCO NON-USER: CPT | Performed by: FAMILY MEDICINE

## 2025-06-11 PROCEDURE — G8427 DOCREV CUR MEDS BY ELIG CLIN: HCPCS | Performed by: FAMILY MEDICINE

## 2025-06-11 SDOH — ECONOMIC STABILITY: FOOD INSECURITY: WITHIN THE PAST 12 MONTHS, THE FOOD YOU BOUGHT JUST DIDN'T LAST AND YOU DIDN'T HAVE MONEY TO GET MORE.: NEVER TRUE

## 2025-06-11 SDOH — ECONOMIC STABILITY: FOOD INSECURITY: WITHIN THE PAST 12 MONTHS, YOU WORRIED THAT YOUR FOOD WOULD RUN OUT BEFORE YOU GOT MONEY TO BUY MORE.: NEVER TRUE

## 2025-06-11 ASSESSMENT — PATIENT HEALTH QUESTIONNAIRE - PHQ9
2. FEELING DOWN, DEPRESSED OR HOPELESS: NOT AT ALL
1. LITTLE INTEREST OR PLEASURE IN DOING THINGS: NOT AT ALL
9. THOUGHTS THAT YOU WOULD BE BETTER OFF DEAD, OR OF HURTING YOURSELF: NOT AT ALL
4. FEELING TIRED OR HAVING LITTLE ENERGY: NOT AT ALL
7. TROUBLE CONCENTRATING ON THINGS, SUCH AS READING THE NEWSPAPER OR WATCHING TELEVISION: NOT AT ALL
8. MOVING OR SPEAKING SO SLOWLY THAT OTHER PEOPLE COULD HAVE NOTICED. OR THE OPPOSITE, BEING SO FIGETY OR RESTLESS THAT YOU HAVE BEEN MOVING AROUND A LOT MORE THAN USUAL: NOT AT ALL
3. TROUBLE FALLING OR STAYING ASLEEP: SEVERAL DAYS
6. FEELING BAD ABOUT YOURSELF - OR THAT YOU ARE A FAILURE OR HAVE LET YOURSELF OR YOUR FAMILY DOWN: NOT AT ALL
SUM OF ALL RESPONSES TO PHQ QUESTIONS 1-9: 2
10. IF YOU CHECKED OFF ANY PROBLEMS, HOW DIFFICULT HAVE THESE PROBLEMS MADE IT FOR YOU TO DO YOUR WORK, TAKE CARE OF THINGS AT HOME, OR GET ALONG WITH OTHER PEOPLE: NOT DIFFICULT AT ALL
5. POOR APPETITE OR OVEREATING: SEVERAL DAYS
SUM OF ALL RESPONSES TO PHQ QUESTIONS 1-9: 2

## 2025-06-11 NOTE — PROGRESS NOTES
Courtney Salvador (:  1965) is a 59 y.o. male,Established patient, here for evaluation of the following chief complaint(s):  Foot Pain (Right foot/ heel pain for a few months)      Assessment & Plan   ASSESSMENT/PLAN:  Courtney \"Benjamín\" was seen today for foot pain.    Diagnoses and all orders for this visit:    Pain of right heel  -     XR CALCANEUS RIGHT (MIN 2 VIEWS); Future    If xray negative will treat with HEP for plantar fasciitis     No follow-ups on file.         Subjective   SUBJECTIVE/OBJECTIVE:  HPI  Pt is a of 59 y.o. male comes in today with   Chief Complaint   Patient presents with    Foot Pain     Right foot/ heel pain for a few months     Pain bottom of right heel. Only in certain positions. Not worse in the am.  3-4 months. No preceding change in activity. Walks for exercise. No pain with that.  No injury.    Vitals:    25 0938   BP: 124/70   Pulse: 64   SpO2: 97%   Weight: 79.4 kg (175 lb)   Height: 1.778 m (5' 10\")       Past Medical History:Reviewed  Medications:Reviewed.  Allergies   Allergen Reactions    Environmental/Seasonal       Social hx:Reviewed.  Social History     Tobacco Use   Smoking Status Former    Current packs/day: 0.00    Average packs/day: 0.5 packs/day for 20.0 years (10.0 ttl pk-yrs)    Types: Cigarettes    Start date:     Quit date:     Years since quittin.4   Smokeless Tobacco Never        Review of Systems       Objective   Physical Exam     Right calcaneal tenderness    An electronic signature was used to authenticate this note.    --Eduar Oglesby MD